# Patient Record
Sex: FEMALE | Race: WHITE | NOT HISPANIC OR LATINO | URBAN - METROPOLITAN AREA
[De-identification: names, ages, dates, MRNs, and addresses within clinical notes are randomized per-mention and may not be internally consistent; named-entity substitution may affect disease eponyms.]

---

## 2017-07-26 ENCOUNTER — EMERGENCY (EMERGENCY)
Facility: HOSPITAL | Age: 62
LOS: 1 days | End: 2017-07-26
Payer: COMMERCIAL

## 2017-07-26 PROCEDURE — 99283 EMERGENCY DEPT VISIT LOW MDM: CPT | Mod: 25

## 2017-07-26 PROCEDURE — 12001 RPR S/N/AX/GEN/TRNK 2.5CM/<: CPT

## 2018-10-16 ENCOUNTER — EMERGENCY (EMERGENCY)
Facility: HOSPITAL | Age: 63
LOS: 1 days | End: 2018-10-16
Payer: COMMERCIAL

## 2018-10-16 PROCEDURE — 99282 EMERGENCY DEPT VISIT SF MDM: CPT

## 2019-06-10 ENCOUNTER — EMERGENCY (EMERGENCY)
Facility: HOSPITAL | Age: 64
LOS: 1 days | End: 2019-06-10
Admitting: EMERGENCY MEDICINE
Payer: COMMERCIAL

## 2019-06-10 PROBLEM — Z00.00 ENCOUNTER FOR PREVENTIVE HEALTH EXAMINATION: Status: ACTIVE | Noted: 2019-06-10

## 2019-06-10 PROCEDURE — 99284 EMERGENCY DEPT VISIT MOD MDM: CPT | Mod: 25

## 2019-06-10 PROCEDURE — 70491 CT SOFT TISSUE NECK W/DYE: CPT | Mod: 26

## 2019-07-09 ENCOUNTER — EMERGENCY (EMERGENCY)
Facility: HOSPITAL | Age: 64
LOS: 1 days | End: 2019-07-09
Admitting: EMERGENCY MEDICINE
Payer: COMMERCIAL

## 2019-07-09 PROCEDURE — 99283 EMERGENCY DEPT VISIT LOW MDM: CPT | Mod: 25

## 2019-07-09 PROCEDURE — 69210 REMOVE IMPACTED EAR WAX UNI: CPT

## 2019-11-11 ENCOUNTER — APPOINTMENT (OUTPATIENT)
Dept: CT IMAGING | Facility: CLINIC | Age: 64
End: 2019-11-11
Payer: COMMERCIAL

## 2019-11-11 VITALS — BODY MASS INDEX: 19.63 KG/M2 | HEIGHT: 60 IN | WEIGHT: 100 LBS

## 2019-11-11 DIAGNOSIS — F17.210 NICOTINE DEPENDENCE, CIGARETTES, UNCOMPLICATED: ICD-10-CM

## 2019-11-11 PROCEDURE — 99406 BEHAV CHNG SMOKING 3-10 MIN: CPT | Mod: 59

## 2019-11-11 PROCEDURE — 99406 BEHAV CHNG SMOKING 3-10 MIN: CPT

## 2019-11-11 PROCEDURE — G0297: CPT

## 2019-11-11 PROCEDURE — G0296 VISIT TO DETERM LDCT ELIG: CPT

## 2019-11-11 NOTE — ASSESSMENT
[Discussed Risks and Advised to Quit Smoking] : Discussed risks and advised to quit smoking [Discussed Cessation Medication] : cessation medication was discussed [Discussed Cessation Strategies] : cessation strategies were discussed [Ready] : Patient is ready for cessation intervention [Smoking Cessation Counseling Given (more than 3 min less than10 min) and Resources Provided] : Smoking cessation counseling given (more than 3 min less than 10 min) and resources provided [de-identified] : Acknowledged how difficult it is to quit smoking.  Advised that quitting smoking is the most important thing a person can do for their health.  Discussed strategies to deal with cravings.  Suggested exercise as a distraction.  Discussed the importance of having a strategy planned in advance of a quit date.  Discussed use of daily nicotine patch and use of gum PRN cravings and how to access through [facility] [employer] Financing assistance program.\par

## 2019-11-11 NOTE — HISTORY OF PRESENT ILLNESS
[TextBox_13] : Referred by Dr. Renate Baron.\par \par Ms. MONTANA is a 64 year year old female with a history of HTN and asthma.\par \par She was seen in the office today for review of eligibility for, as well as, Low-Dose CT lung cancer screening.  Reviewed and confirmed that the patient meets screening eligibility criteria:\par \par 64 years old \par \par Smoking Status: Current Smoker\par \par Number of pack(s) per day: 3 ppd x 5 years, 2 ppd x 22 years, 1 ppd x 21 years, 5 cig daily x 2 years\par Number of years smoked: 50\par Number of pack years smokin\par \par Ms. MONTANA denies any symptoms of lung cancer, including new cough, change in cough, hemoptysis, and unintentional weight loss.\par \par Ms. MONTANA denies any personal history of lung cancer.  Admits to lung cancer in a first degree relative, her father.  Denies any history of lung disease or any history of occupational exposures.

## 2019-11-11 NOTE — PLAN
[FreeTextEntry1] : - Low Dose CT chest for lung cancer screening.\par \par - Encouraged smoking cessation\par \par - Patient declined [facility] Smokers Quitline literature\par \par Engaged in share decision making with Ms. MONTANA.  Answered all questions.  She verbalized understanding and agreement.  She knows to call back with any questions or concerns.\par

## 2019-11-11 NOTE — REASON FOR VISIT
[Initial Evaluation] : an initial evaluation visit [Review of Eligibility] : review of eligibility [Face-to-Face Visit] : face-to-face visit

## 2023-01-26 ENCOUNTER — APPOINTMENT (OUTPATIENT)
Dept: ULTRASOUND IMAGING | Facility: CLINIC | Age: 68
End: 2023-01-26
Payer: MEDICARE

## 2023-01-26 PROCEDURE — 93970 EXTREMITY STUDY: CPT

## 2023-03-04 ENCOUNTER — OFFICE (OUTPATIENT)
Dept: URBAN - METROPOLITAN AREA CLINIC 38 | Facility: CLINIC | Age: 68
Setting detail: OPHTHALMOLOGY
End: 2023-03-04
Payer: MEDICARE

## 2023-03-04 ENCOUNTER — RX ONLY (RX ONLY)
Age: 68
End: 2023-03-04

## 2023-03-04 DIAGNOSIS — H43.811: ICD-10-CM

## 2023-03-04 DIAGNOSIS — H01.001: ICD-10-CM

## 2023-03-04 DIAGNOSIS — H01.004: ICD-10-CM

## 2023-03-04 DIAGNOSIS — H16.223: ICD-10-CM

## 2023-03-04 DIAGNOSIS — H35.373: ICD-10-CM

## 2023-03-04 DIAGNOSIS — H25.13: ICD-10-CM

## 2023-03-04 PROCEDURE — 92014 COMPRE OPH EXAM EST PT 1/>: CPT | Performed by: OPHTHALMOLOGY

## 2023-03-04 PROCEDURE — 92134 CPTRZ OPH DX IMG PST SGM RTA: CPT | Performed by: OPHTHALMOLOGY

## 2023-03-04 ASSESSMENT — KERATOMETRY
OD_AXISANGLE_DEGREES: 166
OS_AXISANGLE_DEGREES: 014
METHOD_AUTO_MANUAL: AUTO
OS_K1POWER_DIOPTERS: 46.25
OD_K2POWER_DIOPTERS: 46.75
OD_K1POWER_DIOPTERS: 45.75
OS_K2POWER_DIOPTERS: 47.25

## 2023-03-04 ASSESSMENT — VISUAL ACUITY
OS_BCVA: 20/30-2
OD_BCVA: 20/30-

## 2023-03-04 ASSESSMENT — REFRACTION_MANIFEST
OS_VA1: 20/30-2
OD_ADD: +2.75
OD_VA1: 20/30-2
OS_CYLINDER: -1.75
OD_SPHERE: PLANO
OS_CYLINDER: -1.50
OD_CYLINDER: -1.00
OU_VA: 20/25+
OS_SPHERE: PLANO
OS_VA2: 20/25(J1)
OS_AXIS: 095
OS_VA2: 20/25(J1)
OS_VA1: 20/30-1
OS_SPHERE: PLANO
OD_ADD: +2.75
OD_AXIS: 080
OD_SPHERE: -0.25
OD_CYLINDER: -1.25
OD_VA2: 20/25(J1)
OD_VA2: 20/25(J1)
OS_ADD: +2.75
OD_VA1: 20/40+2
OS_ADD: +2.75
OU_VA: 20/25+

## 2023-03-04 ASSESSMENT — REFRACTION_CURRENTRX
OS_AXIS: 096
OD_OVR_VA: 20/
OS_CYLINDER: -0.75
OS_VPRISM_DIRECTION: SV
OS_AXIS: 095
OS_VPRISM_DIRECTION: SV
OS_SPHERE: PLANO
OS_SPHERE: -1.75
OD_OVR_VA: 20/
OD_VPRISM_DIRECTION: SV
OD_CYLINDER: -1.00
OD_AXIS: 071
OS_OVR_VA: 20/
OS_OVR_VA: 20/
OD_VPRISM_DIRECTION: SV
OD_SPHERE: -0.25
OD_AXIS: 078
OD_CYLINDER: -0.50
OS_CYLINDER: -1.50
OD_SPHERE: -2.00

## 2023-03-04 ASSESSMENT — TONOMETRY
OD_IOP_MMHG: 20
OS_IOP_MMHG: 20

## 2023-03-04 ASSESSMENT — SUPERFICIAL PUNCTATE KERATITIS (SPK)
OS_SPK: T
OD_SPK: T

## 2023-03-04 ASSESSMENT — LID EXAM ASSESSMENTS
OD_BLEPHARITIS: RUL T
OS_BLEPHARITIS: LUL T

## 2023-03-04 ASSESSMENT — SPHEQUIV_DERIVED
OD_SPHEQUIV: -0.625
OD_SPHEQUIV: -0.75

## 2023-03-04 ASSESSMENT — REFRACTION_AUTOREFRACTION
OS_CYLINDER: -1.75
OD_CYLINDER: -1.75
OS_AXIS: 097
OD_SPHERE: +0.25
OS_SPHERE: PLANO
OD_AXIS: 076

## 2023-03-04 ASSESSMENT — AXIALLENGTH_DERIVED
OD_AL: 22.8952
OD_AL: 22.8494

## 2023-03-04 ASSESSMENT — CONFRONTATIONAL VISUAL FIELD TEST (CVF)
OD_FINDINGS: FULL
OS_FINDINGS: FULL

## 2023-09-12 ENCOUNTER — OFFICE (OUTPATIENT)
Dept: URBAN - METROPOLITAN AREA CLINIC 38 | Facility: CLINIC | Age: 68
Setting detail: OPHTHALMOLOGY
End: 2023-09-12
Payer: MEDICARE

## 2023-09-12 DIAGNOSIS — H43.811: ICD-10-CM

## 2023-09-12 DIAGNOSIS — H35.373: ICD-10-CM

## 2023-09-12 DIAGNOSIS — H01.004: ICD-10-CM

## 2023-09-12 DIAGNOSIS — H16.223: ICD-10-CM

## 2023-09-12 DIAGNOSIS — H01.001: ICD-10-CM

## 2023-09-12 DIAGNOSIS — H25.13: ICD-10-CM

## 2023-09-12 DIAGNOSIS — H35.341: ICD-10-CM

## 2023-09-12 PROCEDURE — 99213 OFFICE O/P EST LOW 20 MIN: CPT | Performed by: OPHTHALMOLOGY

## 2023-09-12 PROCEDURE — 92134 CPTRZ OPH DX IMG PST SGM RTA: CPT | Performed by: OPHTHALMOLOGY

## 2023-09-12 ASSESSMENT — REFRACTION_CURRENTRX
OD_VPRISM_DIRECTION: SV
OD_CYLINDER: -1.00
OS_VPRISM_DIRECTION: SV
OD_VPRISM_DIRECTION: SV
OS_AXIS: 096
OD_SPHERE: -0.25
OS_CYLINDER: -0.75
OS_CYLINDER: -1.50
OD_OVR_VA: 20/
OS_SPHERE: PLANO
OD_OVR_VA: 20/
OD_AXIS: 083
OS_OVR_VA: 20/
OS_AXIS: 096
OD_SPHERE: -2.00
OD_CYLINDER: -0.50
OD_AXIS: 071
OS_SPHERE: -1.75
OS_VPRISM_DIRECTION: SV
OS_OVR_VA: 20/

## 2023-09-12 ASSESSMENT — SUPERFICIAL PUNCTATE KERATITIS (SPK)
OS_SPK: T
OD_SPK: T

## 2023-09-12 ASSESSMENT — REFRACTION_MANIFEST
OS_ADD: +2.75
OS_VA1: 20/30-2
OD_ADD: +2.75
OD_SPHERE: -0.25
OD_AXIS: 090
OD_VA1: 20/40+2
OD_SPHERE: -0.25
OS_ADD: +2.75
OU_VA: 20/25+
OS_AXIS: 090
OD_VA2: 20/25(J1)
OU_VA: 20/20
OS_VA2: 20/25(J1)
OD_AXIS: 080
OD_CYLINDER: -1.00
OD_VA2: 20/25(J1)
OS_SPHERE: PLANO
OS_SPHERE: PLANO
OD_ADD: +2.75
OD_CYLINDER: -1.00
OS_VA1: 20/30
OS_CYLINDER: -1.50
OS_AXIS: 095
OS_CYLINDER: -1.50
OD_VA1: 20/30-
OS_VA2: 20/25(J1)

## 2023-09-12 ASSESSMENT — KERATOMETRY
OS_AXISANGLE_DEGREES: 004
OD_AXISANGLE_DEGREES: 165
OD_K1POWER_DIOPTERS: 45.75
METHOD_AUTO_MANUAL: AUTO
OS_K1POWER_DIOPTERS: 46.00
OS_K2POWER_DIOPTERS: 47.50
OD_K2POWER_DIOPTERS: 6.75

## 2023-09-12 ASSESSMENT — CONFRONTATIONAL VISUAL FIELD TEST (CVF)
OS_FINDINGS: FULL
OD_FINDINGS: FULL

## 2023-09-12 ASSESSMENT — LID EXAM ASSESSMENTS
OD_BLEPHARITIS: RUL T
OS_BLEPHARITIS: LUL T

## 2023-09-12 ASSESSMENT — REFRACTION_AUTOREFRACTION
OS_AXIS: 090
OS_CYLINDER: -2.00
OD_SPHERE: +0.50
OS_SPHERE: +0.25
OD_AXIS: 087
OD_CYLINDER: -2.00

## 2023-09-12 ASSESSMENT — TONOMETRY
OS_IOP_MMHG: 16
OD_IOP_MMHG: 19

## 2023-09-12 ASSESSMENT — VISUAL ACUITY
OS_BCVA: 20/30-
OD_BCVA: 20/30

## 2023-09-12 ASSESSMENT — AXIALLENGTH_DERIVED
OD_AL: 32.81
OD_AL: 32.81
OS_AL: 22.7235
OD_AL: 32.62

## 2023-09-12 ASSESSMENT — SPHEQUIV_DERIVED
OD_SPHEQUIV: -0.75
OD_SPHEQUIV: -0.5
OD_SPHEQUIV: -0.75
OS_SPHEQUIV: -0.75

## 2024-03-12 ENCOUNTER — OFFICE (OUTPATIENT)
Dept: URBAN - METROPOLITAN AREA CLINIC 38 | Facility: CLINIC | Age: 69
Setting detail: OPHTHALMOLOGY
End: 2024-03-12
Payer: MEDICARE

## 2024-03-12 DIAGNOSIS — H01.001: ICD-10-CM

## 2024-03-12 DIAGNOSIS — H25.13: ICD-10-CM

## 2024-03-12 DIAGNOSIS — H43.811: ICD-10-CM

## 2024-03-12 DIAGNOSIS — H35.033: ICD-10-CM

## 2024-03-12 DIAGNOSIS — H16.223: ICD-10-CM

## 2024-03-12 DIAGNOSIS — H35.341: ICD-10-CM

## 2024-03-12 DIAGNOSIS — H35.373: ICD-10-CM

## 2024-03-12 DIAGNOSIS — H01.004: ICD-10-CM

## 2024-03-12 PROCEDURE — 92014 COMPRE OPH EXAM EST PT 1/>: CPT | Performed by: OPHTHALMOLOGY

## 2024-03-12 PROCEDURE — 92250 FUNDUS PHOTOGRAPHY W/I&R: CPT | Performed by: OPHTHALMOLOGY

## 2024-03-12 ASSESSMENT — REFRACTION_CURRENTRX
OS_SPHERE: -1.75
OD_VPRISM_DIRECTION: SV
OS_VPRISM_DIRECTION: SV
OS_OVR_VA: 20/
OD_VPRISM_DIRECTION: SV
OS_CYLINDER: -1.50
OS_OVR_VA: 20/
OD_SPHERE: -2.00
OD_SPHERE: -0.25
OS_AXIS: 096
OS_CYLINDER: -0.75
OD_AXIS: 083
OD_OVR_VA: 20/
OD_AXIS: 071
OD_CYLINDER: -1.00
OD_CYLINDER: -0.50
OS_AXIS: 096
OD_OVR_VA: 20/
OS_VPRISM_DIRECTION: SV
OS_SPHERE: PLANO

## 2024-03-12 ASSESSMENT — REFRACTION_MANIFEST
OS_AXIS: 090
OD_ADD: +2.75
OS_CYLINDER: -1.50
OD_VA1: 20/30-
OD_ADD: +2.75
OD_VA2: 20/25(J1)
OD_SPHERE: -0.25
OS_SPHERE: PLANO
OD_VA2: 20/25(J1)
OD_CYLINDER: -1.00
OS_ADD: +2.75
OS_VA2: 20/25(J1)
OS_AXIS: 095
OD_AXIS: 090
OD_SPHERE: -0.25
OS_SPHERE: PLANO
OU_VA: 20/25+
OD_CYLINDER: -1.00
OS_VA1: 20/30
OU_VA: 20/20
OD_AXIS: 080
OD_VA1: 20/40+2
OS_VA1: 20/30-2
OS_CYLINDER: -1.50
OS_ADD: +2.75
OS_VA2: 20/25(J1)

## 2024-03-12 ASSESSMENT — SPHEQUIV_DERIVED
OD_SPHEQUIV: -0.75
OD_SPHEQUIV: -0.75

## 2024-03-12 ASSESSMENT — LID EXAM ASSESSMENTS
OD_BLEPHARITIS: RUL T
OS_BLEPHARITIS: LUL T